# Patient Record
Sex: FEMALE | Race: WHITE | NOT HISPANIC OR LATINO | Employment: FULL TIME | URBAN - METROPOLITAN AREA
[De-identification: names, ages, dates, MRNs, and addresses within clinical notes are randomized per-mention and may not be internally consistent; named-entity substitution may affect disease eponyms.]

---

## 2017-10-27 PROBLEM — F33.2 SEVERE EPISODE OF RECURRENT MAJOR DEPRESSIVE DISORDER, WITHOUT PSYCHOTIC FEATURES (HCC): Status: ACTIVE | Noted: 2017-10-27

## 2017-10-27 PROBLEM — J30.2 SEASONAL ALLERGIC RHINITIS: Status: ACTIVE | Noted: 2017-10-27

## 2018-04-09 ENCOUNTER — TELEPHONE (OUTPATIENT)
Dept: URGENT CARE | Facility: CLINIC | Age: 38
End: 2018-04-09

## 2018-04-09 NOTE — TELEPHONE ENCOUNTER
The patient was called for re-evaluation, urine culture positive for E coli, sensitive to Levaquin, placed on cipro, continue antibiotic therapy, a message was left, encouraged to call back to the clinic or return to clinic with any questions or concerns.

## 2023-07-08 PROBLEM — F07.81 POST CONCUSSIVE SYNDROME: Status: ACTIVE | Noted: 2023-07-08

## 2023-07-08 PROBLEM — R51.9 CHRONIC NONINTRACTABLE HEADACHE: Status: ACTIVE | Noted: 2023-07-08

## 2023-07-08 PROBLEM — G89.29 CHRONIC NONINTRACTABLE HEADACHE: Status: ACTIVE | Noted: 2023-07-08

## 2023-07-08 PROBLEM — F41.9 ANXIETY: Status: ACTIVE | Noted: 2023-07-08
